# Patient Record
Sex: MALE | Race: BLACK OR AFRICAN AMERICAN | Employment: FULL TIME
[De-identification: names, ages, dates, MRNs, and addresses within clinical notes are randomized per-mention and may not be internally consistent; named-entity substitution may affect disease eponyms.]

---

## 2022-03-21 ENCOUNTER — NURSE TRIAGE (OUTPATIENT)
Dept: OTHER | Facility: CLINIC | Age: 42
End: 2022-03-21
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
## 2023-01-09 ENCOUNTER — HOSPITAL ENCOUNTER (EMERGENCY)
Dept: GENERAL RADIOLOGY | Age: 43
Discharge: HOME | End: 2023-01-12
Payer: COMMERCIAL

## 2023-01-09 ENCOUNTER — HOSPITAL ENCOUNTER (EMERGENCY)
Age: 43
Discharge: HOME | End: 2023-01-09
Attending: EMERGENCY MEDICINE
Payer: COMMERCIAL

## 2023-01-09 VITALS
SYSTOLIC BLOOD PRESSURE: 141 MMHG | TEMPERATURE: 98.2 F | RESPIRATION RATE: 18 BRPM | WEIGHT: 207 LBS | OXYGEN SATURATION: 98 % | BODY MASS INDEX: 28.04 KG/M2 | HEART RATE: 85 BPM | HEIGHT: 72 IN | DIASTOLIC BLOOD PRESSURE: 87 MMHG

## 2023-01-09 DIAGNOSIS — M70.42 PREPATELLAR BURSITIS OF LEFT KNEE: Primary | ICD-10-CM

## 2023-01-09 PROCEDURE — 73562 X-RAY EXAM OF KNEE 3: CPT

## 2023-01-09 PROCEDURE — 96375 TX/PRO/DX INJ NEW DRUG ADDON: CPT

## 2023-01-09 PROCEDURE — 96374 THER/PROPH/DIAG INJ IV PUSH: CPT

## 2023-01-09 PROCEDURE — 6360000002 HC RX W HCPCS

## 2023-01-09 PROCEDURE — 99284 EMERGENCY DEPT VISIT MOD MDM: CPT

## 2023-01-09 RX ORDER — PREDNISONE 20 MG/1
20 TABLET ORAL 2 TIMES DAILY
Qty: 10 TABLET | Refills: 0 | Status: SHIPPED | OUTPATIENT
Start: 2023-01-09 | End: 2023-01-14

## 2023-01-09 RX ORDER — DEXAMETHASONE SODIUM PHOSPHATE 10 MG/ML
10 INJECTION INTRAMUSCULAR; INTRAVENOUS ONCE
Status: COMPLETED | OUTPATIENT
Start: 2023-01-09 | End: 2023-01-09

## 2023-01-09 RX ORDER — NAPROXEN 500 MG/1
500 TABLET ORAL 2 TIMES DAILY
Qty: 14 TABLET | Refills: 0 | Status: SHIPPED | OUTPATIENT
Start: 2023-01-09 | End: 2023-01-16

## 2023-01-09 RX ORDER — KETOROLAC TROMETHAMINE 30 MG/ML
30 INJECTION, SOLUTION INTRAMUSCULAR; INTRAVENOUS ONCE
Status: COMPLETED | OUTPATIENT
Start: 2023-01-09 | End: 2023-01-09

## 2023-01-09 RX ADMIN — KETOROLAC TROMETHAMINE 30 MG: 30 INJECTION, SOLUTION INTRAMUSCULAR at 19:03

## 2023-01-09 RX ADMIN — DEXAMETHASONE SODIUM PHOSPHATE 10 MG: 10 INJECTION INTRAMUSCULAR; INTRAVENOUS at 19:03

## 2023-01-09 ASSESSMENT — PAIN SCALES - GENERAL
PAINLEVEL_OUTOF10: 9
PAINLEVEL_OUTOF10: 9
PAINLEVEL_OUTOF10: 7

## 2023-01-09 ASSESSMENT — ENCOUNTER SYMPTOMS
SHORTNESS OF BREATH: 0
ABDOMINAL PAIN: 0
VOMITING: 0
NAUSEA: 0
DIARRHEA: 0

## 2023-01-09 ASSESSMENT — PAIN - FUNCTIONAL ASSESSMENT: PAIN_FUNCTIONAL_ASSESSMENT: 0-10

## 2023-01-09 NOTE — ED TRIAGE NOTES
Pt ambulatory to triage with c/o waking up with left knee pain on Saturday. Pt denies injury. Pt reports taking Ibuprofen with some relief.

## 2023-01-09 NOTE — Clinical Note
Inga Conklin was seen and treated in our emergency department on 1/9/2023. He may return to work on 01/11/2023. If you have any questions or concerns, please don't hesitate to call.       KENNETH Munoz

## 2023-01-10 NOTE — ED NOTES
Wrapped left knee with ace wrap. Pt tolerated.  I have reviewed discharge instructions with the patient.  The patient verbalized understanding.    Patient left ED via Discharge Method: ambulatory to Home with self.    Opportunity for questions and clarification provided.       Patient given 2 scripts.         To continue your aftercare when you leave the hospital, you may receive an automated call from our care team to check in on how you are doing.  This is a free service and part of our promise to provide the best care and service to meet your aftercare needs.” If you have questions, or wish to unsubscribe from this service please call [phone].  Thank you for Choosing our [facility] Emergency Department.        Marina Mcadams, LITA  01/09/23 6407

## 2023-01-10 NOTE — DISCHARGE INSTRUCTIONS
You were evaluated in the emergency department today for pain in your left knee  X-rays are negative for fracture, bony abnormality, dislocation physical exam appears consistent with prepatellar bursitis. , no joint effusion    Recommend using kneepads for work    Ace bandage placed to your left knee. Follow instructions for RICE    You are given a shot of an NSAID today called Toradol. You are also given a shot of a steroid. I have written you a prescription of steroids to start tomorrow  I have written you prescription for Naprosyn which is an NSAID to be started tomorrow. Take this medication twice daily with food. Do not take this medication with other NSAIDs such as ibuprofen, Motrin, Mobic. Contact the number below to establish primary care    Contact [facility] if he had not heard from them in 2 or 3 days    Return to the emergency department if increased left knee pain with swelling, redness, warmth, development of fevers    We would love to help you get a primary care doctor for follow-up after your emergency department visit. Please call [phone] between 7AM - 6PM Monday to Friday. A care navigator will be able to assist you with setting up a doctor close to your home.

## 2023-01-10 NOTE — ED PROVIDER NOTES
Emergency Department Provider Note                   PCP:                Uriel Carranza MD               Age: 36 y.o. Sex: male       ICD-10-CM    1. Prepatellar bursitis of left knee  M70.42 naproxen (NAPROSYN) 500 MG tablet     predniSONE (DELTASONE) 20 MG tablet          DISPOSITION Decision To Discharge 01/09/2023 07:19:36 PM        Medical Decision Making  Vital signs reviewed, patient stable, NAD, afebrile, nontoxic in appearance     X-ray of left knee obtained out of triage  X-ray of left knee negative for acute bony abnormality, fracture, dislocation    Physical exam is very reassuring. No erythema, warmth, no decreased range of motion. No bony crepitus. No induration. Patient has full range of motion with flexion and extension    Patient has a job as a . Patient states he is often on his knees. Patient is tender to palpation over prepatellar bursa. Likely he has prepatellar bursitis. Will treat with anti-inflammatories. Will treat with IM injection of Toradol here as well as IM injection of Decadron. Patient states he does not have any history of kidney disease. We will discharge him with a course of Naprosyn as well as short course of prednisone. will give patient an Ace bandage as well for RICE. Based on history, physical exam, work-up today in the emergency department, I do not feel additional imaging nor any lab work is warranted at this time. No urgent or emergent findings. Patient is stable and can be discharged home with follow-up to orthopedics and primary care. Patient likely has prepatellar bursitis. Low clinical suspicion for septic joint as patient is afebrile, nontachycardic, no exquisite tenderness, no severe pain with passive range of motion. I discussed physical exam findings, imaging findings, treatment and follow-up with patient.   I discussed signs and symptoms that would warrant a prompt return to the emergency department included these in discharge paperwork as well. Answered any questions that he had. Placed an urgent referral to [facility] for follow-up. Gave patient contact information to establish primary care      History obtained from the patient. Reviewed patient's chart to look for any past medical history, problems, allergies. No indication for EKG. No indication for lab work today. Problems Addressed:  Prepatellar bursitis of left knee: acute illness or injury    Amount and/or Complexity of Data Reviewed  Radiology: ordered and independent interpretation performed. Decision-making details documented in ED Course. Risk  OTC drugs. Prescription drug management. Risk Details: Risk that this may develop into a septic joint. I discussed with patient this risk. Discussed with him signs and symptoms that would warrant a prompt return regarding this risk. Patient verbalized that he understood. There is a risk that patient may not follow-up with [facility] or with primary care. He does not have any providers at this time. He was given resources to make these appointments. Orders Placed This Encounter   Procedures    XR KNEE LEFT (3 VIEWS)    ACE Wrap 4\"        Medications   ketorolac (TORADOL) injection 30 mg (30 mg IntraMUSCular Given 1/9/23 1903)   dexamethasone (DECADRON) injection 10 mg (10 mg IntraMUSCular Given 1/9/23 1903)       New Prescriptions    NAPROXEN (NAPROSYN) 500 MG TABLET    Take 1 tablet by mouth 2 times daily for 7 days    PREDNISONE (DELTASONE) 20 MG TABLET    Take 1 tablet by mouth 2 times daily for 5 days        Quintin James is a 36 y.o. male who presents to the Emergency Department with chief complaint of    Chief Complaint   Patient presents with    Knee Pain      44-year-old male with stated no past medical history presents to the emergency department today with chief complaint of left knee pain beginning 3 days ago.   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 Patient states he woke up 3 days ago with some mild left knee pain and pain has been gradually getting worse. Patient states he did take some ibuprofen yesterday and had knee elevated and pain was much improved. Patient denies any fevers, chills, history of gout, nausea, vomiting, diarrhea. The history is provided by the patient. No  was used. Review of Systems   Constitutional:  Negative for chills and fever. Respiratory:  Negative for shortness of breath. Cardiovascular:  Negative for chest pain. Gastrointestinal:  Negative for abdominal pain, diarrhea, nausea and vomiting. Musculoskeletal:         Left knee pain   Neurological:  Negative for headaches. All other systems reviewed and are negative. History reviewed. No pertinent past medical history. History reviewed. No pertinent surgical history. History reviewed. No pertinent family history. Social History     Socioeconomic History    Marital status: Single     Spouse name: None    Number of children: None    Years of education: None    Highest education level: None         Patient has no known allergies. Previous Medications    No medications on file        Vitals signs and nursing note reviewed. Patient Vitals for the past 4 hrs:   Temp Pulse Resp BP SpO2   01/09/23 1801 98.2 °F (36.8 °C) 86 16 (!) 150/90 98 %          Physical Exam  Vitals and nursing note reviewed. Constitutional:       General: He is not in acute distress. Appearance: Normal appearance. He is normal weight. He is not ill-appearing, toxic-appearing or diaphoretic. HENT:      Head: Normocephalic and atraumatic. Eyes:      General: No scleral icterus. Extraocular Movements: Extraocular movements intact. Conjunctiva/sclera: Conjunctivae normal.   Cardiovascular:      Rate and Rhythm: Normal rate. Pulses: Normal pulses.       Heart sounds: Normal heart sounds. Comments: BilateralPT pulses 2+ and equal  Pulmonary:      Effort: Pulmonary effort is normal.      Breath sounds: Normal breath sounds. Abdominal:      General: Bowel sounds are normal.   Musculoskeletal:         General: Normal range of motion. Cervical back: Normal range of motion. Right knee: Normal.      Left knee: No swelling, deformity, effusion, erythema, ecchymosis, lacerations or bony tenderness. Normal range of motion (Slow flexion and extension due to pain; full flexion and extension). Tenderness present over the patellar tendon. No medial joint line or lateral joint line tenderness. No LCL laxity, MCL laxity, ACL laxity or PCL laxity. Normal pulse. Right lower leg: No edema. Left lower leg: No edema. Legs:       Comments: Tenderness to palpation over patellar tendon and over prepatellar bursa bursa   Skin:     General: Skin is warm and dry. Capillary Refill: Capillary refill takes less than 2 seconds. Comments: No erythema, no induration, no fluctuance to left knee, no warmth of the left knee   Neurological:      General: No focal deficit present. Mental Status: He is alert and oriented to person, place, and time. Psychiatric:         Mood and Affect: Mood normal.         Behavior: Behavior normal.         Thought Content: Thought content normal.         Judgment: Judgment normal.        Procedures    Results for orders placed or performed during the hospital encounter of 01/09/23   XR KNEE LEFT (3 VIEWS)    Narrative    LEFT KNEE SERIES    HISTORY: ; Pain in knee    FINDINGS: 3 views of the knee demonstrate no displaced fracture or malalignment. Soft tissues are normal.      Impression    No acute findings. XR KNEE LEFT (3 VIEWS)   Final Result   No acute findings. Voice dictation software was used during the making of this note.   This software is not perfect and grammatical and other typographical errors may be present. This note has not been completely proofread for errors.       Camille Kumar  01/09/23 1920

## 2023-08-25 ENCOUNTER — HOSPITAL ENCOUNTER (EMERGENCY)
Age: 43
Discharge: HOME | End: 2023-08-25
Attending: EMERGENCY MEDICINE
Payer: COMMERCIAL

## 2023-08-25 VITALS
WEIGHT: 210 LBS | RESPIRATION RATE: 18 BRPM | BODY MASS INDEX: 28.44 KG/M2 | SYSTOLIC BLOOD PRESSURE: 132 MMHG | OXYGEN SATURATION: 98 % | HEIGHT: 72 IN | DIASTOLIC BLOOD PRESSURE: 84 MMHG | TEMPERATURE: 98.5 F | HEART RATE: 81 BPM

## 2023-08-25 DIAGNOSIS — L02.811 SCALP ABSCESS: Primary | ICD-10-CM

## 2023-08-25 PROCEDURE — 99283 EMERGENCY DEPT VISIT LOW MDM: CPT

## 2023-08-25 PROCEDURE — 10061 I&D ABSCESS COMP/MULTIPLE: CPT

## 2023-08-25 RX ORDER — CEPHALEXIN 500 MG/1
500 CAPSULE ORAL 3 TIMES DAILY
Qty: 15 CAPSULE | Refills: 0 | Status: SHIPPED | OUTPATIENT
Start: 2023-08-25 | End: 2023-08-30

## 2023-08-25 ASSESSMENT — ENCOUNTER SYMPTOMS
BACK PAIN: 0
RESPIRATORY NEGATIVE: 1
GASTROINTESTINAL NEGATIVE: 1

## 2023-08-25 ASSESSMENT — PAIN SCALES - GENERAL: PAINLEVEL_OUTOF10: 2

## 2023-08-25 ASSESSMENT — PAIN - FUNCTIONAL ASSESSMENT: PAIN_FUNCTIONAL_ASSESSMENT: 0-10

## 2023-08-25 NOTE — DISCHARGE INSTRUCTIONS
Keep the area clean. It is okay to use warm water to wash and continue to try to express any additional abscess material.  Complete course of antibiotic. Take ibuprofen as needed for pain. If bleeding apply direct pressure for 10 minutes continuously. If you are unable to get it to stop bleeding come back to the ER for assessment.

## 2023-08-25 NOTE — ED TRIAGE NOTES
Pt reports painful bump on top of head for few days. No PCP. Denies F/C, N/V, no drainage. Using Aquaphor cream with no improvement.

## 2024-01-17 ENCOUNTER — HOSPITAL ENCOUNTER (EMERGENCY)
Age: 44
Discharge: HOME | End: 2024-01-17
Payer: COMMERCIAL

## 2024-01-17 VITALS
BODY MASS INDEX: 27.77 KG/M2 | DIASTOLIC BLOOD PRESSURE: 78 MMHG | SYSTOLIC BLOOD PRESSURE: 117 MMHG | OXYGEN SATURATION: 97 % | TEMPERATURE: 99.8 F | HEIGHT: 72 IN | HEART RATE: 108 BPM | WEIGHT: 205 LBS | RESPIRATION RATE: 16 BRPM

## 2024-01-17 DIAGNOSIS — B34.9 VIRAL SYNDROME: Primary | ICD-10-CM

## 2024-01-17 LAB
FLUAV RNA SPEC QL NAA+PROBE: NOT DETECTED
FLUBV RNA SPEC QL NAA+PROBE: NOT DETECTED
SARS-COV-2 RDRP RESP QL NAA+PROBE: NOT DETECTED
SOURCE: NORMAL

## 2024-01-17 PROCEDURE — 87502 INFLUENZA DNA AMP PROBE: CPT

## 2024-01-17 PROCEDURE — 99283 EMERGENCY DEPT VISIT LOW MDM: CPT

## 2024-01-17 PROCEDURE — 87635 SARS-COV-2 COVID-19 AMP PRB: CPT

## 2024-01-17 RX ORDER — BROMPHENIRAMINE MALEATE, PSEUDOEPHEDRINE HYDROCHLORIDE, AND DEXTROMETHORPHAN HYDROBROMIDE 2; 30; 10 MG/5ML; MG/5ML; MG/5ML
5 SYRUP ORAL 4 TIMES DAILY PRN
Qty: 118 ML | Refills: 0 | Status: SHIPPED | OUTPATIENT
Start: 2024-01-17 | End: 2024-01-24

## 2024-01-17 RX ORDER — PREDNISONE 50 MG/1
50 TABLET ORAL DAILY
Qty: 5 TABLET | Refills: 0 | Status: SHIPPED | OUTPATIENT
Start: 2024-01-17 | End: 2024-01-22

## 2024-01-17 ASSESSMENT — ENCOUNTER SYMPTOMS
SHORTNESS OF BREATH: 0
PHOTOPHOBIA: 0
SORE THROAT: 1
ABDOMINAL PAIN: 0
VOMITING: 0
FACIAL SWELLING: 0
COUGH: 1
TROUBLE SWALLOWING: 0

## 2024-01-17 ASSESSMENT — PAIN - FUNCTIONAL ASSESSMENT: PAIN_FUNCTIONAL_ASSESSMENT: NONE - DENIES PAIN

## 2024-01-17 ASSESSMENT — LIFESTYLE VARIABLES
HOW OFTEN DO YOU HAVE A DRINK CONTAINING ALCOHOL: NEVER
HOW MANY STANDARD DRINKS CONTAINING ALCOHOL DO YOU HAVE ON A TYPICAL DAY: PATIENT DOES NOT DRINK

## 2024-01-17 NOTE — ED PROVIDER NOTES
Emergency Department Provider Note       PCP: Francis Gee MD   Age: 43 y.o.   Sex: male     DISPOSITION Decision To Discharge 01/17/2024 12:12:52 PM       ICD-10-CM    1. Viral syndrome  B34.9           Medical Decision Making     Complexity of Problems Addressed:  1 acute illness with systemic symptoms    Data Reviewed and Analyzed:  I independently ordered and reviewed each unique test.             Discussion of management or test interpretation.  In summary this is a 43-year-old male patient who presented for evaluation of symptoms most consistent with viral respiratory illness. Based on my evaluation I feel the patient is at low risk for alternative causes such as pneumonia, meningitis, respiratory distress, sepsis. The reasoning behind my decision process is that the patient is without acute distress or signs of respiratory compromise.  Vitals initially markable for mild tachycardia which I suspect is secondary to low-grade temperature and very mild dehydration there is no chest pain or pleuritic pain. Patient's lung sounds are clear without evidence of rales, tachypnea, egophony, labored breathing or other adventitious lung sounds at this time. No meningeal signs, neck stiffness, vomiting, altered mental status, severe headache.  Plan will be outpatient therapy.  Short course of steroids prescribed.  Patient instructed to rest, fluids, and take over the counter antipyretics as needed. The follow up for this patient will be as needed. However, I have specifically counseled the patient that they should follow up immediately should they develop worsening symptoms including but not limited to chest pain, shortness of breath, difficulty breathing. The patient has verbalized understanding and is in agreement with the treatment plan.        Risk of Complications and/or Morbidity of Patient Management:  Prescription drug management performed.  Patient was discharged risks and benefits of hospitalization were

## 2024-01-17 NOTE — DISCHARGE INSTRUCTIONS
Your COVID and flu test were both negative.  I suspect you have a viral illness that will need to run its course.    Use steroids for the full 5 days to relieve your symptoms.    Utilize prescribed cough medication as needed for cough.    I recommend alternating tylenol and advil every 4 hours for pain, fever, bodyaches.  Use cough drops, warm salt water gargles for sore throat.  Use flonase nasal spray for congestion.  Drink plenty of fluids and do not allow yourself to get dehydrated.  Continue to monitor your symptoms and return for any new or worsening symptoms.  Otherwise please follow up with your primary care provider.    As we discussed, I did not find a life threatening cause of your symptoms today. However, THAT DOES NOT MEAN IT COULD NOT DEVELOP. If you develop ANY new or worsening symptoms, it is critical that you return for re-evaluation. This includes any symptoms that are concerning to you, especially symptoms such as high fever unresponsive to medication, chest pain, difficulty breathing.  If you do not return for re-evaluation, you risk serious complications, including death.

## 2024-01-17 NOTE — ED NOTES
I have reviewed discharge instructions with the patient.  The patient verbalized understanding.    Patient left ED via Discharge Method: ambulatory to Home with ( self).    Opportunity for questions and clarification provided.       Patient given 2 scripts.         To continue your aftercare when you leave the hospital, you may receive an automated call from our care team to check in on how you are doing.  This is a free service and part of our promise to provide the best care and service to meet your aftercare needs.” If you have questions, or wish to unsubscribe from this service please call [phone].  Thank you for Choosing our [facility] Emergency Department.

## 2025-03-04 ENCOUNTER — HOSPITAL ENCOUNTER (EMERGENCY)
Age: 45
Discharge: HOME | End: 2025-03-04
Attending: EMERGENCY MEDICINE
Payer: COMMERCIAL

## 2025-03-04 VITALS
BODY MASS INDEX: 30.07 KG/M2 | RESPIRATION RATE: 18 BRPM | TEMPERATURE: 98 F | WEIGHT: 222 LBS | HEIGHT: 72 IN | HEART RATE: 82 BPM | DIASTOLIC BLOOD PRESSURE: 93 MMHG | SYSTOLIC BLOOD PRESSURE: 141 MMHG | OXYGEN SATURATION: 98 %

## 2025-03-04 DIAGNOSIS — M62.830 LUMBAR PARASPINAL MUSCLE SPASM: Primary | ICD-10-CM

## 2025-03-04 PROCEDURE — 96372 THER/PROPH/DIAG INJ SC/IM: CPT

## 2025-03-04 PROCEDURE — 6360000002 HC RX W HCPCS: Performed by: EMERGENCY MEDICINE

## 2025-03-04 PROCEDURE — 99284 EMERGENCY DEPT VISIT MOD MDM: CPT

## 2025-03-04 RX ORDER — KETOROLAC TROMETHAMINE 30 MG/ML
30 INJECTION, SOLUTION INTRAMUSCULAR; INTRAVENOUS
Status: COMPLETED | OUTPATIENT
Start: 2025-03-04 | End: 2025-03-04

## 2025-03-04 RX ORDER — MELOXICAM 15 MG/1
15 TABLET ORAL DAILY PRN
Qty: 30 TABLET | Refills: 0 | Status: SHIPPED | OUTPATIENT
Start: 2025-03-04

## 2025-03-04 RX ORDER — CYCLOBENZAPRINE HCL 10 MG
10 TABLET ORAL NIGHTLY PRN
Qty: 10 TABLET | Refills: 0 | Status: SHIPPED | OUTPATIENT
Start: 2025-03-04 | End: 2025-03-14

## 2025-03-04 RX ORDER — LIDOCAINE 50 MG/G
1 PATCH TOPICAL DAILY
Qty: 10 PATCH | Refills: 0 | Status: SHIPPED | OUTPATIENT
Start: 2025-03-04 | End: 2025-03-14

## 2025-03-04 RX ADMIN — KETOROLAC TROMETHAMINE 30 MG: 30 INJECTION, SOLUTION INTRAMUSCULAR at 08:08

## 2025-03-04 ASSESSMENT — PAIN DESCRIPTION - ORIENTATION: ORIENTATION: LOWER;LEFT

## 2025-03-04 ASSESSMENT — PAIN SCALES - GENERAL
PAINLEVEL_OUTOF10: 5
PAINLEVEL_OUTOF10: 8

## 2025-03-04 ASSESSMENT — LIFESTYLE VARIABLES
HOW OFTEN DO YOU HAVE A DRINK CONTAINING ALCOHOL: 2-4 TIMES A MONTH
HOW MANY STANDARD DRINKS CONTAINING ALCOHOL DO YOU HAVE ON A TYPICAL DAY: 1 OR 2

## 2025-03-04 ASSESSMENT — PAIN DESCRIPTION - DESCRIPTORS: DESCRIPTORS: STABBING

## 2025-03-04 ASSESSMENT — PAIN DESCRIPTION - LOCATION: LOCATION: BACK

## 2025-03-04 ASSESSMENT — PAIN - FUNCTIONAL ASSESSMENT: PAIN_FUNCTIONAL_ASSESSMENT: 0-10

## 2025-03-04 ASSESSMENT — ENCOUNTER SYMPTOMS: BACK PAIN: 1

## 2025-03-04 ASSESSMENT — PAIN DESCRIPTION - PAIN TYPE: TYPE: ACUTE PAIN

## 2025-03-04 NOTE — ED PROVIDER NOTES
Emergency Department Provider Note       PCP: Francis Gee MD   Age: 44 y.o.   Sex: male     DISPOSITION Decision To Discharge 03/04/2025 08:03:16 AM    ICD-10-CM    1. Lumbar paraspinal muscle spasm  M62.830           Medical Decision Making     DDX:    Acute/chronic back pain, contusion, myofascial strain, musculoskeletal injury, lumbar strain,  muscle spasm,    Disc herniation, compression fracture,    ED Course as of 03/04/25 0805   Tue Mar 04, 2025   0802 I talked to the patient about my findings on his physical exam.  At this point time I do not believe imaging would be beneficial.  We talked about concerning return criteria for cauda equina syndrome and he expressed understanding.  And I talked the patient about the use of NSAIDs lidocaine patches muscle relaxers and such therapy along with stretching.  Patient expressed understanding and agreement with this plan of management.  He was offered trigger point injections in the emergency department versus and Toradol and patient elected to have the Toradol IM. [KH]      ED Course User Index  [KH] Rui Puga, DO     1 acute complicated illness or injury.  Prescription drug management performed.  Shared medical decision making was utilized in creating the patients health plan today.  I independently ordered and reviewed each unique test.                         History     Patient is a pleasant 44-year-old male presenting to the emergency department today complaining of left low back pain.  Patient states that in December at work he was lifting something heavy and felt a pulling sensation in his back.  He was able to get through that episode and had been doing well up until Sunday when his back started hurting again.  The patient states that movement makes pain worse.  He denies any loss control of bowel or bladder or decrease sensation in the lower extremities.  He says he feels a pulling going down into the hamstring on the left but denies any

## 2025-03-04 NOTE — ED NOTES
Patient mobility status  with no difficulty.     I have reviewed discharge instructions with the patient.  The patient verbalized understanding.    Patient left ED via Discharge Method: ambulatory to Home with  self .    Opportunity for questions and clarification provided.     Patient given 0 scripts.      X 3 prescriptions sent to pharmacy

## 2025-03-04 NOTE — DISCHARGE INSTRUCTIONS
Take the NSAID as prescribed with food for the next week then as needed.      Do the stretching exercises like we discussed.    Drink 1 to 2 L of water daily.    Take the Flexeril at nighttime before going to bed as needed.      Use the Lidoderm patch over the affected area 12 hours on and a minimum of 12 hours off before putting a new patch on your body    We would love to help you get a primary care doctor for follow-up after your emergency department visit.    Please call [phone] between 7AM - 6PM Monday to Friday.  A care navigator will be able to assist you with setting up a doctor close to your home.

## 2025-08-10 ENCOUNTER — HOSPITAL ENCOUNTER (EMERGENCY)
Age: 45
Discharge: HOME | End: 2025-08-10

## 2025-08-10 ENCOUNTER — APPOINTMENT (OUTPATIENT)
Dept: GENERAL RADIOLOGY | Age: 45
End: 2025-08-10

## 2025-08-10 VITALS
BODY MASS INDEX: 29.12 KG/M2 | DIASTOLIC BLOOD PRESSURE: 97 MMHG | OXYGEN SATURATION: 97 % | SYSTOLIC BLOOD PRESSURE: 145 MMHG | HEIGHT: 72 IN | TEMPERATURE: 98.2 F | WEIGHT: 215 LBS | HEART RATE: 85 BPM | RESPIRATION RATE: 18 BRPM

## 2025-08-10 DIAGNOSIS — M54.2 NECK PAIN: ICD-10-CM
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
## 2025-08-10 DIAGNOSIS — S39.012A STRAIN OF LUMBAR REGION, INITIAL ENCOUNTER: ICD-10-CM

## 2025-08-10 PROCEDURE — 72100 X-RAY EXAM L-S SPINE 2/3 VWS: CPT

## 2025-08-10 PROCEDURE — 99283 EMERGENCY DEPT VISIT LOW MDM: CPT

## 2025-08-10 PROCEDURE — 72040 X-RAY EXAM NECK SPINE 2-3 VW: CPT

## 2025-08-10 RX ORDER — METHOCARBAMOL 750 MG/1
750 TABLET, FILM COATED ORAL 3 TIMES DAILY
Qty: 21 TABLET | Refills: 0 | Status: SHIPPED | OUTPATIENT
Start: 2025-08-10 | End: 2025-08-17

## 2025-08-10 ASSESSMENT — PAIN DESCRIPTION - LOCATION: LOCATION: BACK

## 2025-08-10 ASSESSMENT — PAIN SCALES - GENERAL: PAINLEVEL_OUTOF10: 6

## 2025-08-10 ASSESSMENT — PAIN - FUNCTIONAL ASSESSMENT: PAIN_FUNCTIONAL_ASSESSMENT: 0-10
